# Patient Record
Sex: FEMALE | Race: WHITE | NOT HISPANIC OR LATINO | URBAN - NONMETROPOLITAN AREA
[De-identification: names, ages, dates, MRNs, and addresses within clinical notes are randomized per-mention and may not be internally consistent; named-entity substitution may affect disease eponyms.]

---

## 2024-09-25 ENCOUNTER — OFFICE VISIT (OUTPATIENT)
Dept: URGENT CARE | Facility: URGENT CARE | Age: 59
End: 2024-09-25
Payer: COMMERCIAL

## 2024-09-25 VITALS
HEIGHT: 62 IN | OXYGEN SATURATION: 95 % | WEIGHT: 112.43 LBS | BODY MASS INDEX: 20.69 KG/M2 | RESPIRATION RATE: 16 BRPM | SYSTOLIC BLOOD PRESSURE: 121 MMHG | HEART RATE: 85 BPM | TEMPERATURE: 98.1 F | DIASTOLIC BLOOD PRESSURE: 84 MMHG

## 2024-09-25 DIAGNOSIS — J40 BRONCHITIS: Primary | ICD-10-CM

## 2024-09-25 RX ORDER — AZITHROMYCIN 250 MG/1
TABLET, FILM COATED ORAL
Qty: 6 TABLET | Refills: 0 | Status: SHIPPED | OUTPATIENT
Start: 2024-09-25

## 2024-09-25 RX ORDER — PREDNISONE 20 MG/1
40 TABLET ORAL DAILY
Qty: 10 TABLET | Refills: 0 | Status: SHIPPED | OUTPATIENT
Start: 2024-09-25 | End: 2024-09-30

## 2024-09-25 ASSESSMENT — ENCOUNTER SYMPTOMS
FATIGUE: 0
PALPITATIONS: 0
DIARRHEA: 0
COUGH: 1
NERVOUS/ANXIOUS: 0
ENDOCRINE NEGATIVE: 1
BACK PAIN: 0
DYSURIA: 0
ABDOMINAL PAIN: 0
HEADACHES: 1
CARDIOVASCULAR NEGATIVE: 1
EYES NEGATIVE: 1
NECK PAIN: 0
CONSTITUTIONAL NEGATIVE: 1
SORE THROAT: 0
SEIZURES: 0
SHORTNESS OF BREATH: 1

## 2024-09-25 NOTE — PATIENT INSTRUCTIONS
Upper Respiratory Infection Treatment includes multiple support treatments:  1) Fluids are essential to assist body's ability to heal itself.  This has been shown to shorten any infection.  - Humidifier/Vaporizer - can be helpful is air is dry, since the air will take fluid away from the body  - Drink Fluids - a lot of them.  Water is best, but can flavor it.  Also water down juice/sport drinks or teas.   When hungry - eat foods that have a lot of fluid and avoid most of the dry or greasy meals.     - Soups, yogurts, fruits and other fluid giving foods can be helpful.   - Eat small amounts frequently when hungry rather than larger amounts, since gut may be tender after being sick    2) Inflammation:  - Prednisone or Medrol Dose Pack (Steroid) Rx - for inflammation, congestion, headache, body aches.     - Do NOT use ibuprofen or naproxen while taking this prescription  - Allergic inflammation (any drainage or congesiton from irritants or allergens)    - May be treated with cetirizine (Zyrtec) or like once a day non drowsy medication.      - Start or keep taking while having viral illness, since it may not be the main cause, this inflammation can contribute and make the symptoms worse.  - Acetaminophen (Tylenol) - for congestion, headache and body aches.      (Often already in multisymptom medications, so check active ingredients on OTC medications before adding more)    3) Drainage/Phlegm:  - Guaifenesin (Mucinex, Robitussin) - for congestion, mucous, cough  - Vicks vapor rub - may be use on feet or chest, cover so child doesn't eat the ointment.    (May use in Vaporizer/humidifier - if design allows)  - Nasal saline to rinse the nose - spray or rinses.    4) Restore/Maintain gut and body's balance  - Probiotic - eating yogurt and/or taking a supplement can help the body tolerate the medication and regain balance while and after being sick

## 2024-09-25 NOTE — PROGRESS NOTES
Subjective   Patient ID: Doreen Ellis is a 58 y.o. female. They present today with a chief complaint of Cough (HA, Sinus congestion and rib pain due to coughing).    History of Present Illness  Doreen Ellis is a 58 y.o. female who presents to the  for a concern of cough x 1 week.  She admits her grandson was sick prior, but no one tested for COVID.  She has been using OTC Sudafed, Mucinex, and Ibuprofen.  Along with herbal and vitamin supplements.  She has no history of allergies, asthma or COPD.  She admits that her chest feels like it can't get a good breath.  She denies known fever.      Cough  Associated symptoms include headaches, postnasal drip and shortness of breath. Pertinent negatives include no chest pain, ear pain, rash or sore throat.       Past Medical History  Allergies as of 09/25/2024    (No Known Allergies)       (Not in a hospital admission)       No past medical history on file.    Past Surgical History:   Procedure Laterality Date    OTHER SURGICAL HISTORY  05/27/2022    Lumpectomy    OTHER SURGICAL HISTORY  05/27/2022    Hysterectomy            Review of Systems  Review of Systems   Constitutional: Negative.  Negative for fatigue.   HENT:  Positive for postnasal drip. Negative for congestion, dental problem, ear pain and sore throat.    Eyes: Negative.    Respiratory:  Positive for cough and shortness of breath.    Cardiovascular: Negative.  Negative for chest pain, palpitations and leg swelling.   Gastrointestinal:  Negative for abdominal pain and diarrhea.   Endocrine: Negative.    Genitourinary: Negative.  Negative for dysuria.   Musculoskeletal:  Negative for back pain and neck pain.   Skin: Negative.  Negative for rash.   Neurological:  Positive for headaches. Negative for seizures.   Psychiatric/Behavioral:  Negative for behavioral problems. The patient is not nervous/anxious.        Objective    Vitals:    09/25/24 1904   BP: 121/84   BP Location: Left arm   Patient  "Position: Sitting   Pulse: 85   Resp: 16   Temp: 36.7 °C (98.1 °F)   TempSrc: Oral   SpO2: 95%   Weight: 51 kg (112 lb 7 oz)   Height: 1.575 m (5' 2\")     No LMP recorded. Patient has had a hysterectomy.    Physical Exam  Vitals and nursing note reviewed.   Constitutional:       Appearance: Normal appearance.   HENT:      Head: Normocephalic and atraumatic.      Right Ear: Tympanic membrane, ear canal and external ear normal. There is no impacted cerumen.      Left Ear: Tympanic membrane, ear canal and external ear normal. There is no impacted cerumen.      Nose: Congestion and rhinorrhea present.      Mouth/Throat:      Mouth: Mucous membranes are moist.      Pharynx: No oropharyngeal exudate or posterior oropharyngeal erythema.   Eyes:      Extraocular Movements: Extraocular movements intact.      Pupils: Pupils are equal, round, and reactive to light.   Cardiovascular:      Rate and Rhythm: Normal rate and regular rhythm.   Pulmonary:      Breath sounds: Normal breath sounds.      Comments: Decreased breath sounds, less movement on right  Abdominal:      Palpations: Abdomen is soft.   Musculoskeletal:         General: No swelling. Normal range of motion.      Cervical back: Normal range of motion.   Lymphadenopathy:      Cervical: No cervical adenopathy.   Skin:     General: Skin is warm and dry.   Neurological:      General: No focal deficit present.      Mental Status: She is alert and oriented to person, place, and time.   Psychiatric:         Mood and Affect: Mood normal.       Procedures    Point of Care Test & Imaging Results from this visit  No results found for this visit on 09/25/24.   No results found.    Diagnostic study results (if any) were reviewed by Suzy Archibald DO.    Assessment/Plan   Allergies, medications, history, and pertinent labs/EKGs/Imaging reviewed by Suzy Archibald DO.     Orders and Diagnoses  Diagnoses and all orders for this visit:  Bronchitis  -     predniSONE (Deltasone) 20 mg " tablet; Take 2 tablets (40 mg) by mouth once daily for 5 days.  -     azithromycin (Zithromax) 250 mg tablet; Take 2 tabs (500 mg) by mouth today, than 1 daily for 4 days.    Patient Instructions   Upper Respiratory Infection Treatment includes multiple support treatments:  1) Fluids are essential to assist body's ability to heal itself.  This has been shown to shorten any infection.  - Humidifier/Vaporizer - can be helpful is air is dry, since the air will take fluid away from the body  - Drink Fluids - a lot of them.  Water is best, but can flavor it.  Also water down juice/sport drinks or teas.   When hungry - eat foods that have a lot of fluid and avoid most of the dry or greasy meals.     - Soups, yogurts, fruits and other fluid giving foods can be helpful.   - Eat small amounts frequently when hungry rather than larger amounts, since gut may be tender after being sick    2) Inflammation:  - Prednisone or Medrol Dose Pack (Steroid) Rx - for inflammation, congestion, headache, body aches.     - Do NOT use ibuprofen or naproxen while taking this prescription  - Allergic inflammation (any drainage or congesiton from irritants or allergens)    - May be treated with cetirizine (Zyrtec) or like once a day non drowsy medication.      - Start or keep taking while having viral illness, since it may not be the main cause, this inflammation can contribute and make the symptoms worse.  - Acetaminophen (Tylenol) - for congestion, headache and body aches.      (Often already in multisymptom medications, so check active ingredients on OTC medications before adding more)    3) Drainage/Phlegm:  - Guaifenesin (Mucinex, Robitussin) - for congestion, mucous, cough  - Vicks vapor rub - may be use on feet or chest, cover so child doesn't eat the ointment.    (May use in Vaporizer/humidifier - if design allows)  - Nasal saline to rinse the nose - spray or rinses.    4) Restore/Maintain gut and body's balance  - Probiotic - eating  yogurt and/or taking a supplement can help the body tolerate the medication and regain balance while and after being sick         Medical Admin Record      Patient disposition: Home    Electronically signed by Suzy Archibald DO  7:26 PM